# Patient Record
Sex: MALE | URBAN - METROPOLITAN AREA
[De-identification: names, ages, dates, MRNs, and addresses within clinical notes are randomized per-mention and may not be internally consistent; named-entity substitution may affect disease eponyms.]

---

## 2017-10-24 NOTE — PATIENT DISCUSSION
IOP OD a bit high but patient had company over last night forgot to take latanoprost OU.  re-check IOP in one week and if still elevated will add additional IOP lowering agent.  for now continue all gtts as scheduled and ed latanoprost with strict compliance.

## 2017-10-24 NOTE — PATIENT DISCUSSION
S/P SURGERY: Patient to continue all medications as instructed. Routine post-operative precautions were reviewed with patient. Patient is to call if any pain, redness, decrease in vision, discharge, flashes or floaters. The patient was asked to keep the follow up appointment that is scheduled.

## 2017-10-31 NOTE — PATIENT DISCUSSION
IOP better today as patient took Latanoprost last night so continue all gtts as scheduled and ed latanoprost with strict compliance.  IOP will likely continue to come down as we taper off steroid.  Watch closely.

## 2017-12-04 NOTE — PATIENT DISCUSSION
Cataract surgery makes the vitrectomy surgery technically easier allowing for easier maneuvering of instruments in the eye at the time of surgery and better visualization of the macula.

## 2017-12-05 NOTE — PATIENT DISCUSSION
I eRx PA tid OD bc pt under the impression he is to take Durezol tid for 5 weeks until he sees Dr Kalen Benítez again.  I will email Dr Kalen Benítez to review the chart and decide how to proceed with gtts and notify patient [place].

## 2017-12-13 ENCOUNTER — IMPORTED ENCOUNTER (OUTPATIENT)
Dept: URBAN - METROPOLITAN AREA CLINIC 43 | Facility: CLINIC | Age: 82
End: 2017-12-13

## 2017-12-13 PROBLEM — H35.3131: Noted: 2017-12-13

## 2017-12-13 PROBLEM — E11.3291: Noted: 2017-12-13

## 2017-12-13 PROBLEM — E11.9: Noted: 2017-12-13

## 2017-12-13 PROBLEM — H43.813: Noted: 2017-12-13

## 2017-12-13 PROBLEM — H35.373: Noted: 2017-12-13

## 2017-12-13 PROBLEM — H25.13: Noted: 2017-12-13

## 2017-12-21 ENCOUNTER — IMPORTED ENCOUNTER (OUTPATIENT)
Dept: URBAN - METROPOLITAN AREA CLINIC 43 | Facility: CLINIC | Age: 82
End: 2017-12-21

## 2017-12-27 ENCOUNTER — IMPORTED ENCOUNTER (OUTPATIENT)
Dept: URBAN - METROPOLITAN AREA CLINIC 43 | Facility: CLINIC | Age: 82
End: 2017-12-27

## 2017-12-27 PROBLEM — H25.13: Noted: 2017-12-27

## 2018-01-05 NOTE — PATIENT DISCUSSION
I eRx PA tid OD bc pt under the impression he is to take Durezol tid for 5 weeks until he sees Dr Jakub Haque again.  I will email Dr Jakub Haque to review the chart and decide how to proceed with gtts and notify patient as [place] is [place] one [place] from now until 12[place]23.

## 2018-01-24 NOTE — PATIENT DISCUSSION
Patient had complaints of pain on the outside of the eye a few weeks ago. TPB applied pressure on LL temporally where patient pointed out the pain and determined everything was ok, given that the patient no longer presented symptoms.

## 2018-01-24 NOTE — PATIENT DISCUSSION
Recommended against surgery at this time given that patient is happy with present vision and TPB does not believe surgery would improve anything.

## 2018-01-24 NOTE — PATIENT DISCUSSION
I eRx PA tid OD bc pt under the impression he is to take Durezol tid for 5 weeks until he sees Dr Harini Pal again.  I will email Dr Harini Pal to review the chart and decide how to proceed with gtts and notify patient as [place] is [place] one [place] from now until 12/23.

## 2018-01-24 NOTE — PATIENT DISCUSSION
Talked about floaters and recommended against PPv surgery, since surgeries present risks and the floaters are not visually significant.

## 2018-02-01 ENCOUNTER — IMPORTED ENCOUNTER (OUTPATIENT)
Dept: URBAN - METROPOLITAN AREA CLINIC 43 | Facility: CLINIC | Age: 83
End: 2018-02-01

## 2018-02-01 PROBLEM — Z96.1: Noted: 2018-02-01

## 2018-02-05 ENCOUNTER — IMPORTED ENCOUNTER (OUTPATIENT)
Dept: URBAN - METROPOLITAN AREA CLINIC 43 | Facility: CLINIC | Age: 83
End: 2018-02-05

## 2018-02-05 PROBLEM — Z96.1: Noted: 2018-02-05

## 2018-02-05 PROBLEM — H25.12: Noted: 2018-02-05

## 2018-02-15 ENCOUNTER — IMPORTED ENCOUNTER (OUTPATIENT)
Dept: URBAN - METROPOLITAN AREA CLINIC 43 | Facility: CLINIC | Age: 83
End: 2018-02-15

## 2018-02-15 PROBLEM — Z96.1: Noted: 2018-02-15

## 2018-02-21 ENCOUNTER — IMPORTED ENCOUNTER (OUTPATIENT)
Dept: URBAN - METROPOLITAN AREA CLINIC 43 | Facility: CLINIC | Age: 83
End: 2018-02-21

## 2018-02-21 PROBLEM — Z96.1: Noted: 2018-02-21

## 2018-03-15 ENCOUNTER — IMPORTED ENCOUNTER (OUTPATIENT)
Dept: URBAN - METROPOLITAN AREA CLINIC 43 | Facility: CLINIC | Age: 83
End: 2018-03-15

## 2018-03-15 PROBLEM — Z96.1: Noted: 2018-03-15

## 2018-03-15 PROBLEM — H35.373: Noted: 2018-03-15

## 2018-04-23 NOTE — PATIENT DISCUSSION
I eRx PA tid OD bc pt under the impression he is to take Durezol tid for 5 weeks until he sees Dr Geovanny Sharpe again.  I will email Dr Geovanny Sharpe to review the chart and decide how to proceed with gtts and notify patient as [place] is [place] one [place] from now until 12/23.

## 2018-06-29 ENCOUNTER — IMPORTED ENCOUNTER (OUTPATIENT)
Dept: URBAN - METROPOLITAN AREA CLINIC 43 | Facility: CLINIC | Age: 83
End: 2018-06-29

## 2018-09-28 ENCOUNTER — IMPORTED ENCOUNTER (OUTPATIENT)
Dept: URBAN - METROPOLITAN AREA CLINIC 43 | Facility: CLINIC | Age: 83
End: 2018-09-28

## 2018-11-05 NOTE — PATIENT DISCUSSION
I eRx PA tid OD bc pt under the impression he is to take Durezol tid for 5 weeks until he sees Dr Cecil Torres again.  I will email Dr Cecil Torres to review the chart and decide how to proceed with gtts and notify patient as [place] is [place] one [place] from now until 12/23.

## 2018-11-05 NOTE — PATIENT DISCUSSION
11/05/18-Retina-follow up in 6 months, recommend refraction w/ Dr. Deysi Bravo to see if glasses will sharpen vision.

## 2018-11-27 NOTE — PATIENT DISCUSSION
11/05/18-Retina-follow up in 6 months, recommend refraction w/ Dr. Osmin Ford to see if glasses will sharpen vision.

## 2018-11-27 NOTE — PATIENT DISCUSSION
I eRx PA tid OD bc pt under the impression he is to take Durezol tid for 5 weeks until he sees Dr Cecil Lee again.  I will email Dr Cecil Lee to review the chart and decide how to proceed with gtts and notify patient as he is traveling [place] one week from now until 12/23.

## 2019-01-03 ENCOUNTER — IMPORTED ENCOUNTER (OUTPATIENT)
Dept: URBAN - METROPOLITAN AREA CLINIC 43 | Facility: CLINIC | Age: 84
End: 2019-01-03

## 2019-01-10 ENCOUNTER — IMPORTED ENCOUNTER (OUTPATIENT)
Dept: URBAN - METROPOLITAN AREA CLINIC 43 | Facility: CLINIC | Age: 84
End: 2019-01-10

## 2019-01-10 PROBLEM — H26.493: Noted: 2019-01-10

## 2019-01-10 PROBLEM — E11.9: Noted: 2019-01-10

## 2019-01-10 PROBLEM — H35.373: Noted: 2019-01-10

## 2019-01-10 PROBLEM — E11.3291: Noted: 2019-01-10

## 2019-03-25 ENCOUNTER — IMPORTED ENCOUNTER (OUTPATIENT)
Dept: URBAN - METROPOLITAN AREA CLINIC 43 | Facility: CLINIC | Age: 84
End: 2019-03-25

## 2019-03-25 ENCOUNTER — PREPPED CHART (OUTPATIENT)
Dept: URBAN - METROPOLITAN AREA CLINIC 32 | Facility: CLINIC | Age: 84
End: 2019-03-25

## 2019-03-25 PROBLEM — H26.493: Noted: 2019-03-25

## 2019-05-06 NOTE — PATIENT DISCUSSION
I eRx PA tid OD bc pt under the impression he is to take Durezol tid for 5 weeks until he sees Dr Mandi Em again.  I will email Dr Mandi Em to review the chart and decide how to proceed with gtts and notify patient as he is [place] one [place] from now until 12/23.

## 2019-10-07 NOTE — PATIENT DISCUSSION
I eRx PA tid OD bc pt under the impression he is to take Durezol tid for 5 weeks until he sees Dr Drake Pardo again.  I will email Dr Drake Pardo to review the chart and decide how to proceed with gtts and notify patient as [place] is [place] one [place] from now until 12/23.

## 2020-02-13 ASSESSMENT — TONOMETRY
OD_IOP_MMHG: 11
OS_IOP_MMHG: 12

## 2020-02-13 ASSESSMENT — VISUAL ACUITY
OD_SC: 20/40-3
OS_SC: 20/30-1
OU_SC: 20/30+1

## 2020-02-14 ENCOUNTER — ESTABLISHED PATIENT (OUTPATIENT)
Dept: URBAN - METROPOLITAN AREA CLINIC 32 | Facility: CLINIC | Age: 85
End: 2020-02-14

## 2020-02-14 DIAGNOSIS — H01.022: ICD-10-CM

## 2020-02-14 DIAGNOSIS — H01.025: ICD-10-CM

## 2020-02-14 PROCEDURE — 92012 INTRM OPH EXAM EST PATIENT: CPT

## 2020-02-14 ASSESSMENT — TONOMETRY
OD_IOP_MMHG: 11
OS_IOP_MMHG: 11

## 2020-02-14 ASSESSMENT — VISUAL ACUITY
OS_SC: 20/25-3
OD_SC: 20/40

## 2020-04-19 ASSESSMENT — VISUAL ACUITY
OS_OTHER: 20/50.
OS_SC: J10
OD_CC: J1
OD_SC: 20/30-2
OD_CC: J3
OD_OTHER: 20/400.
OS_SC: J10
OS_CC: 20/30
OS_CC: J3
OD_SC: 20/30+
OD_SC: 20/30
OD_SC: 20/40 +1
OS_SC: 20/30 -3
OD_SC: J10
OD_SC: 20/25
OD_SC: 20/25+1
OS_SC: 20/20 -1
OS_CC: 20/50 +1
OS_SC: 20/30-1
OD_OTHER: 20/400.
OD_SC: 20/40 +1
OD_SC: 20/40-3
OS_SC: 20/30-2
OS_SC: 20/20-2
OS_OTHER: 20/400.
OS_SC: 20/30 -3
OD_SC: J10
OS_OTHER: 20/400.
OD_SC: 20/60-1
OS_SC: 20/20-2
OS_CC: J1
OD_PH: 20/25+3
OS_SC: 20/30+
OD_CC: 20/40 -3
OS_OTHER: 20/400.
OD_PH: 20/40+
OD_SC: 20/40-2
OD_CC: 20/30
OS_CC: 20/50+1

## 2020-04-19 ASSESSMENT — KERATOMETRY
OS_K2POWER_DIOPTERS: 40.25
OS_AXISANGLE_DEGREES: 170
OD_K2POWER_DIOPTERS: 40.5
OD_K1POWER_DIOPTERS: 40.75
OD_AXISANGLE2_DEGREES: 120
OD_AXISANGLE_DEGREES: 120
OD_AXISANGLE_DEGREES: 30
OS_AXISANGLE2_DEGREES: 75
OS_AXISANGLE2_DEGREES: 75
OS_K1POWER_DIOPTERS: 40.25
OD_AXISANGLE2_DEGREES: 110
OD_K1POWER_DIOPTERS: 41.25
OS_AXISANGLE_DEGREES: 165
OS_K1POWER_DIOPTERS: 41.25
OD_K2POWER_DIOPTERS: 39.5
OS_K2POWER_DIOPTERS: 40
OS_K1POWER_DIOPTERS: 41.25
OD_K2POWER_DIOPTERS: 40
OD_K1POWER_DIOPTERS: 40.25
OS_AXISANGLE_DEGREES: 75
OS_AXISANGLE_DEGREES: 165
OD_K2POWER_DIOPTERS: 39
OD_AXISANGLE_DEGREES: 34
OD_K1POWER_DIOPTERS: 41.25
OD_AXISANGLE2_DEGREES: 30
OD_K2POWER_DIOPTERS: 40
OS_AXISANGLE2_DEGREES: 165
OD_K1POWER_DIOPTERS: 39.25
OS_K2POWER_DIOPTERS: 40
OD_AXISANGLE2_DEGREES: 115
OS_AXISANGLE2_DEGREES: 80
OD_AXISANGLE_DEGREES: 25
OD_AXISANGLE_DEGREES: 20
OS_K2POWER_DIOPTERS: 41.25
OD_AXISANGLE2_DEGREES: 124
OS_K1POWER_DIOPTERS: 40.75

## 2020-04-19 ASSESSMENT — TONOMETRY
OS_IOP_MMHG: 11.0
OD_IOP_MMHG: 12.0
OD_IOP_MMHG: 10.0
OS_IOP_MMHG: 13.0
OD_IOP_MMHG: 11.0
OD_IOP_MMHG: 18.0
OS_IOP_MMHG: 14.0
OS_IOP_MMHG: 15.0
OD_IOP_MMHG: 11.0
OD_IOP_MMHG: 15.0
OS_IOP_MMHG: 12.0
OD_IOP_MMHG: 12.0
OS_IOP_MMHG: 20.0
OS_IOP_MMHG: 17.0
OD_IOP_MMHG: 11.0

## 2020-12-04 ENCOUNTER — ESTABLISHED COMPREHENSIVE EXAM (OUTPATIENT)
Dept: URBAN - METROPOLITAN AREA CLINIC 32 | Facility: CLINIC | Age: 85
End: 2020-12-04

## 2020-12-04 DIAGNOSIS — H35.373: ICD-10-CM

## 2020-12-04 DIAGNOSIS — Z96.1: ICD-10-CM

## 2020-12-04 DIAGNOSIS — H10.13: ICD-10-CM

## 2020-12-04 PROCEDURE — 92134 CPTRZ OPH DX IMG PST SGM RTA: CPT

## 2020-12-04 PROCEDURE — 92014 COMPRE OPH EXAM EST PT 1/>: CPT

## 2020-12-04 PROCEDURE — 92015 DETERMINE REFRACTIVE STATE: CPT

## 2020-12-04 ASSESSMENT — KERATOMETRY
OD_K1POWER_DIOPTERS: 40.75
OD_AXISANGLE2_DEGREES: 29
OS_AXISANGLE_DEGREES: 79
OS_K2POWER_DIOPTERS: 40.25
OD_K2POWER_DIOPTERS: 39.25
OS_AXISANGLE2_DEGREES: 169
OD_AXISANGLE_DEGREES: 119
OS_K1POWER_DIOPTERS: 41.25

## 2020-12-04 ASSESSMENT — VISUAL ACUITY
OS_SC: 20/30-2
OD_SC: J5
OD_SC: 20/40+2
OS_SC: J5-1

## 2020-12-04 ASSESSMENT — TONOMETRY
OD_IOP_MMHG: 12
OS_IOP_MMHG: 13

## 2021-01-13 NOTE — PATIENT DISCUSSION
I eRx PA tid OD bc pt under the impression he is to take Durezol tid for 5 weeks until he sees Dr Clarisse Anderson again.  I will email Dr Clarisse Anderson to review the chart and decide how to proceed with gtts and notify patient as he is traveling [place] one week from now until 12/23.

## 2022-01-07 ENCOUNTER — COMPREHENSIVE EXAM (OUTPATIENT)
Dept: URBAN - METROPOLITAN AREA CLINIC 32 | Facility: CLINIC | Age: 87
End: 2022-01-07

## 2022-01-07 DIAGNOSIS — H35.373: ICD-10-CM

## 2022-01-07 PROCEDURE — 92014 COMPRE OPH EXAM EST PT 1/>: CPT

## 2022-01-07 PROCEDURE — 92134 CPTRZ OPH DX IMG PST SGM RTA: CPT

## 2022-01-07 PROCEDURE — 92015 DETERMINE REFRACTIVE STATE: CPT

## 2022-01-07 ASSESSMENT — TONOMETRY
OD_IOP_MMHG: 12
OS_IOP_MMHG: 13

## 2022-01-07 ASSESSMENT — VISUAL ACUITY
OD_PH: 20/30-1
OS_SC: J3
OS_SC: 20/30-1
OD_SC: 20/40-1
OD_SC: J2
OU_SC: J2

## 2022-01-07 ASSESSMENT — KERATOMETRY
OS_K2POWER_DIOPTERS: 40.25
OS_K1POWER_DIOPTERS: 41.75
OS_AXISANGLE2_DEGREES: 165
OD_AXISANGLE_DEGREES: 130
OD_K1POWER_DIOPTERS: 40.25
OD_K2POWER_DIOPTERS: 39.25
OD_AXISANGLE2_DEGREES: 40
OS_AXISANGLE_DEGREES: 75

## 2022-02-04 NOTE — PATIENT DISCUSSION
2/4/2022:  The IOP is in the target range but needs updated HVF will get in near future.  get disc photos for serial monitoring at next visit.

## 2022-02-04 NOTE — PATIENT DISCUSSION
I eRx PA tid OD bc pt under the impression he is to take Durezol tid for 5 weeks until he sees Dr Jose Lopez again.  I will email Dr Jose Lopez to review the chart and decide how to proceed with gtts and notify patient as [place] is [place] one [place] from now until 12/23.

## 2022-04-12 NOTE — PATIENT DISCUSSION
I eRx PA tid OD bc pt under the impression he is to take Durezol tid for 5 weeks until he sees Dr Kristine Cobb again.  I will email Dr Kristine Cobb to review the chart and decide how to proceed with gtts and notify patient as [place] is [place] one [place] from now until 12/23.

## 2022-11-14 ENCOUNTER — OFFICE VISIT (OUTPATIENT)
Dept: URBAN - METROPOLITAN AREA CLINIC 68 | Facility: CLINIC | Age: 87
End: 2022-11-14

## 2022-11-14 RX ORDER — PRAVASTATIN SODIUM 40 MG/1
TABLET ORAL
Qty: 90 TABLET | Status: ACTIVE | COMMUNITY

## 2022-11-14 RX ORDER — DABIGATRAN ETEXILATE MESYLATE 110 MG/1
CAPSULE ORAL
Qty: 180 UNSPECIFIED | Status: ACTIVE | COMMUNITY

## 2022-11-14 RX ORDER — LISINOPRIL 5 MG/1
TABLET ORAL
Qty: 90 TABLET | Status: ACTIVE | COMMUNITY

## 2022-11-14 RX ORDER — SAXAGLIPTIN 5 MG/1
TABLET, FILM COATED ORAL
Qty: 90 TABLET | Status: ACTIVE | COMMUNITY

## 2022-11-14 NOTE — HPI-MIGRATED HPI
Transition of Care : Patient evaluated due to chronic GI symptoms. Referred having intermittent episodes of diarrhea and some fecal incontinence. Referred symptoms are chronic but more frequent latelly.  Referred associated intermittent episodes of constipation.  Denies nausea, vomits, dysphagia, odynophagia, heartburn, abdominal pain, diarrhea, GI bleeding or weight loss

## 2022-12-07 ENCOUNTER — OFFICE VISIT (OUTPATIENT)
Dept: URBAN - METROPOLITAN AREA CLINIC 68 | Facility: CLINIC | Age: 87
End: 2022-12-07

## 2022-12-12 ENCOUNTER — TELEPHONE ENCOUNTER (OUTPATIENT)
Dept: URBAN - METROPOLITAN AREA CLINIC 68 | Facility: CLINIC | Age: 87
End: 2022-12-12

## 2023-01-03 ENCOUNTER — OFFICE VISIT (OUTPATIENT)
Dept: URBAN - METROPOLITAN AREA CLINIC 66 | Facility: CLINIC | Age: 88
End: 2023-01-03

## 2023-01-03 RX ORDER — RIFAXIMIN 550 MG/1
1 TABLET TABLET ORAL
Qty: 42 CAPSULE | Refills: 0 | OUTPATIENT
Start: 2023-01-03 | End: 2023-01-17

## 2023-01-03 RX ORDER — DABIGATRAN ETEXILATE MESYLATE 110 MG/1
CAPSULE ORAL
Qty: 180 UNSPECIFIED | Status: ACTIVE | COMMUNITY

## 2023-01-03 RX ORDER — LISINOPRIL 5 MG/1
TABLET ORAL
Qty: 90 TABLET | Status: ACTIVE | COMMUNITY

## 2023-01-03 RX ORDER — PRAVASTATIN SODIUM 40 MG/1
TABLET ORAL
Qty: 90 TABLET | Status: ACTIVE | COMMUNITY

## 2023-01-03 RX ORDER — SAXAGLIPTIN 5 MG/1
TABLET, FILM COATED ORAL
Qty: 90 TABLET | Status: ACTIVE | COMMUNITY

## 2023-01-03 RX ORDER — SPIRONOLACTONE 25 MG/1
TABLET ORAL
Qty: 45 TABLET | Status: ACTIVE | COMMUNITY

## 2023-01-03 NOTE — HPI-MIGRATED HPI
Transition of Care : Patient evaluated due to diarrhea/IBs/SIBO. Had recent breath test consistent with SIBO.  Today referred that persists with intermittent epidodes of diarrhea.  ALso referred having some fecal leakage problems.  Denies nausea, vomits, dysphagia, odynophagia, heartburn, abdominal pain, constipation GI bleeding or weight loss

## 2023-11-01 ENCOUNTER — OFFICE VISIT (OUTPATIENT)
Dept: URBAN - METROPOLITAN AREA CLINIC 66 | Facility: CLINIC | Age: 88
End: 2023-11-01
Payer: MEDICARE

## 2023-11-01 VITALS
DIASTOLIC BLOOD PRESSURE: 80 MMHG | WEIGHT: 227 LBS | SYSTOLIC BLOOD PRESSURE: 142 MMHG | HEIGHT: 70 IN | HEART RATE: 67 BPM | BODY MASS INDEX: 32.5 KG/M2 | OXYGEN SATURATION: 97 %

## 2023-11-01 DIAGNOSIS — R19.7 DIARRHEA: ICD-10-CM

## 2023-11-01 DIAGNOSIS — R15.2 FECAL URGENCY: ICD-10-CM

## 2023-11-01 DIAGNOSIS — K58.0 IRRITABLE BOWEL SYNDROME WITH DIARRHEA: ICD-10-CM

## 2023-11-01 PROCEDURE — 99214 OFFICE O/P EST MOD 30 MIN: CPT

## 2023-11-01 RX ORDER — SPIRONOLACTONE 25 MG/1
TABLET ORAL
Qty: 45 TABLET | Status: ACTIVE | COMMUNITY

## 2023-11-01 RX ORDER — LISINOPRIL 5 MG/1
TABLET ORAL
Qty: 90 TABLET | Status: ACTIVE | COMMUNITY

## 2023-11-01 RX ORDER — PRAVASTATIN SODIUM 40 MG/1
TABLET ORAL
Qty: 90 TABLET | Status: ACTIVE | COMMUNITY

## 2023-11-01 RX ORDER — DABIGATRAN ETEXILATE MESYLATE 110 MG/1
CAPSULE ORAL
Qty: 180 UNSPECIFIED | Status: ACTIVE | COMMUNITY

## 2023-11-01 RX ORDER — SAXAGLIPTIN 5 MG/1
TABLET, FILM COATED ORAL
Qty: 90 TABLET | Status: DISCONTINUED | COMMUNITY

## 2023-11-01 NOTE — HPI-TODAY'S VISIT:
Patient presents for evaluation of diarrhea with some urgency. Refers diarrhea as yellow, liquid with occassional small formed stools. Refers diarrhea did improve with discontinuation of metamucil. Refers hx of SIBO. Denies nausea, vomiting, dysphagia, odynophagia, heartburn, abdominal pain, constipation, hematochezia, melena, or unintentional weight loss.

## 2023-11-15 ENCOUNTER — OFFICE VISIT (OUTPATIENT)
Dept: URBAN - METROPOLITAN AREA CLINIC 66 | Facility: CLINIC | Age: 88
End: 2023-11-15

## 2023-11-22 ENCOUNTER — LAB OUTSIDE AN ENCOUNTER (OUTPATIENT)
Dept: URBAN - METROPOLITAN AREA CLINIC 68 | Facility: CLINIC | Age: 88
End: 2023-11-22

## 2023-11-24 ENCOUNTER — TELEPHONE ENCOUNTER (OUTPATIENT)
Dept: URBAN - METROPOLITAN AREA CLINIC 68 | Facility: CLINIC | Age: 88
End: 2023-11-24

## 2023-11-24 RX ORDER — VANCOMYCIN HYDROCHLORIDE 125 MG/1
1 CAPSULE CAPSULE ORAL TWICE A DAY
Qty: 28 CAPSULE | Refills: 0 | OUTPATIENT
Start: 2023-11-24 | End: 2023-12-08

## 2023-11-27 ENCOUNTER — TELEPHONE ENCOUNTER (OUTPATIENT)
Dept: URBAN - METROPOLITAN AREA CLINIC 68 | Facility: CLINIC | Age: 88
End: 2023-11-27

## 2023-11-27 ENCOUNTER — ERX REFILL RESPONSE (OUTPATIENT)
Dept: URBAN - METROPOLITAN AREA CLINIC 68 | Facility: CLINIC | Age: 88
End: 2023-11-27

## 2023-11-27 RX ORDER — VANCOMYCIN HYDROCHLORIDE 125 MG/1
1 CAPSULE CAPSULE ORAL TWICE A DAY
Qty: 28 CAPSULE | Refills: 0 | OUTPATIENT
Start: 2023-11-24 | End: 2023-11-27

## 2023-11-27 RX ORDER — VANCOMYCIN HYDROCHLORIDE 125 MG/1
1 CAPSULE CAPSULE ORAL
Qty: 56 CAPSULE | Refills: 0 | OUTPATIENT
Start: 2023-11-27 | End: 2023-12-11

## 2023-11-29 ENCOUNTER — OFFICE VISIT (OUTPATIENT)
Dept: URBAN - METROPOLITAN AREA CLINIC 66 | Facility: CLINIC | Age: 88
End: 2023-11-29
Payer: MEDICARE

## 2023-11-29 ENCOUNTER — OFFICE VISIT (OUTPATIENT)
Dept: URBAN - METROPOLITAN AREA CLINIC 66 | Facility: CLINIC | Age: 88
End: 2023-11-29

## 2023-11-29 VITALS
WEIGHT: 223 LBS | OXYGEN SATURATION: 99 % | BODY MASS INDEX: 31.92 KG/M2 | HEART RATE: 90 BPM | HEIGHT: 70 IN | DIASTOLIC BLOOD PRESSURE: 90 MMHG | SYSTOLIC BLOOD PRESSURE: 136 MMHG

## 2023-11-29 DIAGNOSIS — A04.72 C. DIFFICILE DIARRHEA: ICD-10-CM

## 2023-11-29 DIAGNOSIS — R19.7 DIARRHEA: ICD-10-CM

## 2023-11-29 DIAGNOSIS — R15.2 FECAL URGENCY: ICD-10-CM

## 2023-11-29 DIAGNOSIS — K58.0 IRRITABLE BOWEL SYNDROME WITH DIARRHEA: ICD-10-CM

## 2023-11-29 PROCEDURE — 99214 OFFICE O/P EST MOD 30 MIN: CPT

## 2023-11-29 RX ORDER — SPIRONOLACTONE 25 MG/1
TABLET ORAL
Qty: 45 TABLET | Status: ACTIVE | COMMUNITY

## 2023-11-29 RX ORDER — VANCOMYCIN HYDROCHLORIDE 125 MG/1
1 CAPSULE CAPSULE ORAL
Qty: 56 CAPSULE | Refills: 0 | Status: ACTIVE | COMMUNITY
Start: 2023-11-27 | End: 2023-12-11

## 2023-11-29 RX ORDER — DABIGATRAN ETEXILATE MESYLATE 110 MG/1
CAPSULE ORAL
Qty: 180 UNSPECIFIED | Status: ACTIVE | COMMUNITY

## 2023-11-29 RX ORDER — LISINOPRIL 5 MG/1
TABLET ORAL
Qty: 90 TABLET | Status: ACTIVE | COMMUNITY

## 2023-11-29 RX ORDER — PRAVASTATIN SODIUM 40 MG/1
TABLET ORAL
Qty: 90 TABLET | Status: ACTIVE | COMMUNITY

## 2023-11-29 NOTE — HPI-TODAY'S VISIT:
Patient presents for evaluation of diarrhea with some urgency. Refers recent C. Diff positive test and is on day 3 of vanco 125mg QID. Refers significant improvement of symptoms since starting abx, especially urgency. Refers did previously trial benefiber w/o improvement. Refers hx of IBS-D. Denies fever, chills, nausea, vomiting, dysphagia, odynophagia, heartburn, abdominal pain, constipation, hematochezia, melena, or unintentional weight loss.

## 2023-11-29 NOTE — PHYSICAL EXAM GASTROINTESTINAL
Abdomen (Limited by body habitus) , soft, nontender, nondistended , no guarding or rigidity , no masses palpable , normal bowel sounds , Liver and Spleen , no hepatomegaly present , no hepatosplenomegaly , liver nontender , spleen not palpable

## 2023-11-30 LAB
CALPROTECTIN, FECAL: 139
CAMPYLOBACTER SPP. AG,EIA: (no result)
CLOSTRIDIUM DIFFICILE: (no result)
LACTOFERRIN, FECAL, QUANT.: 73.4
OVA AND PARASITES, CONC AND PERM SMEAR: (no result)
PANCREATIC ELASTASE, FECAL: >500
SALMONELLA AND SHIGELLA, CULTURE: (no result)
SHIGA TOXINS, EIA W/RFL TO E.COLI O157 CULTURE: (no result)

## 2023-12-13 ENCOUNTER — OFFICE VISIT (OUTPATIENT)
Dept: URBAN - METROPOLITAN AREA CLINIC 66 | Facility: CLINIC | Age: 88
End: 2023-12-13
Payer: MEDICARE

## 2023-12-13 VITALS
BODY MASS INDEX: 31.92 KG/M2 | SYSTOLIC BLOOD PRESSURE: 140 MMHG | WEIGHT: 223 LBS | HEIGHT: 70 IN | HEART RATE: 76 BPM | OXYGEN SATURATION: 98 % | DIASTOLIC BLOOD PRESSURE: 90 MMHG

## 2023-12-13 DIAGNOSIS — K58.0 IRRITABLE BOWEL SYNDROME WITH DIARRHEA: ICD-10-CM

## 2023-12-13 DIAGNOSIS — R19.7 DIARRHEA: ICD-10-CM

## 2023-12-13 DIAGNOSIS — R15.2 FECAL URGENCY: ICD-10-CM

## 2023-12-13 DIAGNOSIS — A04.72 C. DIFFICILE DIARRHEA: ICD-10-CM

## 2023-12-13 PROBLEM — 71820002: Status: ACTIVE | Noted: 2023-11-01

## 2023-12-13 PROBLEM — 5891000119102: Status: ACTIVE | Noted: 2023-11-27

## 2023-12-13 PROCEDURE — 99213 OFFICE O/P EST LOW 20 MIN: CPT

## 2023-12-13 RX ORDER — PRAVASTATIN SODIUM 40 MG/1
TABLET ORAL
Qty: 90 TABLET | Status: ACTIVE | COMMUNITY

## 2023-12-13 RX ORDER — SPIRONOLACTONE 25 MG/1
TABLET ORAL
Qty: 45 TABLET | Status: ACTIVE | COMMUNITY

## 2023-12-13 RX ORDER — LISINOPRIL 5 MG/1
TABLET ORAL
Qty: 90 TABLET | Status: ACTIVE | COMMUNITY

## 2023-12-13 RX ORDER — DABIGATRAN ETEXILATE MESYLATE 110 MG/1
CAPSULE ORAL
Qty: 180 UNSPECIFIED | Status: ACTIVE | COMMUNITY

## 2023-12-13 NOTE — HPI-TODAY'S VISIT:
Patient presents for follow up of C. Diff diarrhea s/p abx therapy with vanco (125mf QID x 14 days). Refers he is feeling "great" today and states he is achieving the "best bowel movements" in years since completing abx therapy. Refers he has completed course of vanco and currently takes probiotic daily. Refers no diarrhea, no fever, and no abdominal pain. He is achieving daily, formed stools. Refers hx of IBS-D. Denies fever, chills, nausea, vomiting, dysphagia, odynophagia, heartburn, abdominal pain, diarrhea, constipation, hematochezia, melena, or unintentional weight loss.

## 2024-03-26 ENCOUNTER — COMPREHENSIVE EXAM (OUTPATIENT)
Dept: URBAN - METROPOLITAN AREA CLINIC 32 | Facility: CLINIC | Age: 89
End: 2024-03-26

## 2024-03-26 DIAGNOSIS — H43.813: ICD-10-CM

## 2024-03-26 DIAGNOSIS — H01.025: ICD-10-CM

## 2024-03-26 DIAGNOSIS — H01.021: ICD-10-CM

## 2024-03-26 DIAGNOSIS — H01.022: ICD-10-CM

## 2024-03-26 DIAGNOSIS — H16.223: ICD-10-CM

## 2024-03-26 DIAGNOSIS — H35.033: ICD-10-CM

## 2024-03-26 DIAGNOSIS — H01.024: ICD-10-CM

## 2024-03-26 DIAGNOSIS — E11.9: ICD-10-CM

## 2024-03-26 DIAGNOSIS — H35.373: ICD-10-CM

## 2024-03-26 PROCEDURE — 99214 OFFICE O/P EST MOD 30 MIN: CPT

## 2024-03-26 PROCEDURE — 92250 FUNDUS PHOTOGRAPHY W/I&R: CPT

## 2024-03-26 PROCEDURE — 92015 DETERMINE REFRACTIVE STATE: CPT

## 2024-03-26 ASSESSMENT — KERATOMETRY
OS_K2POWER_DIOPTERS: 39.75
OD_AXISANGLE2_DEGREES: 39
OS_K1POWER_DIOPTERS: 40.25
OD_K1POWER_DIOPTERS: 40.25
OS_AXISANGLE_DEGREES: 56
OD_K2POWER_DIOPTERS: 39.25
OD_AXISANGLE_DEGREES: 129
OS_AXISANGLE2_DEGREES: 146

## 2024-03-26 ASSESSMENT — VISUAL ACUITY
OS_SC: 20/30-1
OD_SC: J1+/-1
OS_SC: J2-1
OD_SC: 20/50+1

## 2024-03-26 ASSESSMENT — TONOMETRY
OS_IOP_MMHG: 14
OD_IOP_MMHG: 12

## 2024-05-16 ENCOUNTER — LAB OUTSIDE AN ENCOUNTER (OUTPATIENT)
Dept: URBAN - METROPOLITAN AREA CLINIC 66 | Facility: CLINIC | Age: 89
End: 2024-05-16

## 2024-05-16 ENCOUNTER — LAB OUTSIDE AN ENCOUNTER (OUTPATIENT)
Dept: URBAN - METROPOLITAN AREA CLINIC 68 | Facility: CLINIC | Age: 89
End: 2024-05-16

## 2024-05-16 ENCOUNTER — OFFICE VISIT (OUTPATIENT)
Dept: URBAN - METROPOLITAN AREA CLINIC 66 | Facility: CLINIC | Age: 89
End: 2024-05-16
Payer: MEDICARE

## 2024-05-16 ENCOUNTER — TELEPHONE ENCOUNTER (OUTPATIENT)
Dept: URBAN - METROPOLITAN AREA CLINIC 68 | Facility: CLINIC | Age: 89
End: 2024-05-16

## 2024-05-16 VITALS
SYSTOLIC BLOOD PRESSURE: 140 MMHG | OXYGEN SATURATION: 96 % | HEART RATE: 77 BPM | WEIGHT: 218 LBS | HEIGHT: 70 IN | BODY MASS INDEX: 31.21 KG/M2 | DIASTOLIC BLOOD PRESSURE: 80 MMHG

## 2024-05-16 DIAGNOSIS — R19.4 CHANGE IN BOWEL HABITS: ICD-10-CM

## 2024-05-16 DIAGNOSIS — K59.00 CONSTIPATION, UNSPECIFIED CONSTIPATION TYPE: ICD-10-CM

## 2024-05-16 PROCEDURE — 99213 OFFICE O/P EST LOW 20 MIN: CPT

## 2024-05-16 RX ORDER — SPIRONOLACTONE 25 MG/1
TABLET ORAL
Qty: 45 TABLET | Status: ACTIVE | COMMUNITY

## 2024-05-16 RX ORDER — PRAVASTATIN SODIUM 40 MG/1
TABLET ORAL
Qty: 90 TABLET | Status: ACTIVE | COMMUNITY

## 2024-05-16 RX ORDER — LISINOPRIL 5 MG/1
TABLET ORAL
Qty: 90 TABLET | Status: ACTIVE | COMMUNITY

## 2024-05-16 RX ORDER — DABIGATRAN ETEXILATE MESYLATE 110 MG/1
CAPSULE ORAL
Qty: 180 UNSPECIFIED | Status: ACTIVE | COMMUNITY

## 2024-05-23 ENCOUNTER — OFFICE VISIT (OUTPATIENT)
Dept: URBAN - METROPOLITAN AREA CLINIC 66 | Facility: CLINIC | Age: 89
End: 2024-05-23
Payer: MEDICARE

## 2024-05-23 ENCOUNTER — DASHBOARD ENCOUNTERS (OUTPATIENT)
Age: 89
End: 2024-05-23

## 2024-05-23 VITALS
OXYGEN SATURATION: 96 % | HEIGHT: 70 IN | SYSTOLIC BLOOD PRESSURE: 124 MMHG | WEIGHT: 211 LBS | DIASTOLIC BLOOD PRESSURE: 82 MMHG | BODY MASS INDEX: 30.21 KG/M2 | HEART RATE: 77 BPM

## 2024-05-23 DIAGNOSIS — K59.00 CONSTIPATION, UNSPECIFIED CONSTIPATION TYPE: ICD-10-CM

## 2024-05-23 DIAGNOSIS — R19.4 CHANGE IN BOWEL HABITS: ICD-10-CM

## 2024-05-23 PROBLEM — 14760008: Status: ACTIVE | Noted: 2024-05-16

## 2024-05-23 PROBLEM — 88111009: Status: ACTIVE | Noted: 2024-05-16

## 2024-05-23 PROCEDURE — 99213 OFFICE O/P EST LOW 20 MIN: CPT

## 2024-05-23 RX ORDER — LISINOPRIL 5 MG/1
TABLET ORAL
Qty: 90 TABLET | Status: ACTIVE | COMMUNITY

## 2024-05-23 RX ORDER — DABIGATRAN ETEXILATE MESYLATE 110 MG/1
CAPSULE ORAL
Qty: 180 UNSPECIFIED | Status: ACTIVE | COMMUNITY

## 2024-05-23 RX ORDER — PRAVASTATIN SODIUM 40 MG/1
TABLET ORAL
Qty: 90 TABLET | Status: ACTIVE | COMMUNITY

## 2024-05-23 RX ORDER — SPIRONOLACTONE 25 MG/1
TABLET ORAL
Qty: 45 TABLET | Status: ACTIVE | COMMUNITY

## 2025-04-08 ENCOUNTER — COMPREHENSIVE EXAM (OUTPATIENT)
Age: OVER 89
End: 2025-04-08

## 2025-04-08 DIAGNOSIS — H35.373: ICD-10-CM

## 2025-04-08 DIAGNOSIS — E11.9: ICD-10-CM

## 2025-04-08 DIAGNOSIS — H01.025: ICD-10-CM

## 2025-04-08 DIAGNOSIS — H43.813: ICD-10-CM

## 2025-04-08 DIAGNOSIS — H16.223: ICD-10-CM

## 2025-04-08 DIAGNOSIS — H01.022: ICD-10-CM

## 2025-04-08 PROCEDURE — 99214 OFFICE O/P EST MOD 30 MIN: CPT

## 2025-04-08 PROCEDURE — 92250 FUNDUS PHOTOGRAPHY W/I&R: CPT

## 2025-04-08 PROCEDURE — 92015 DETERMINE REFRACTIVE STATE: CPT
